# Patient Record
Sex: FEMALE | Race: WHITE | Employment: OTHER | URBAN - METROPOLITAN AREA
[De-identification: names, ages, dates, MRNs, and addresses within clinical notes are randomized per-mention and may not be internally consistent; named-entity substitution may affect disease eponyms.]

---

## 2017-10-27 LAB
ALBUMIN SERPL-MCNC: 3.8 G/DL
ALP BLD-CCNC: 93 U/L
ALT SERPL-CCNC: 17 U/L
ANION GAP SERPL CALCULATED.3IONS-SCNC: 14 MMOL/L
AST SERPL-CCNC: 16 U/L
AVERAGE GLUCOSE: NORMAL
BASOPHILS ABSOLUTE: 0.24 /ΜL
BASOPHILS RELATIVE PERCENT: 2 %
BILIRUB SERPL-MCNC: 0.4 MG/DL (ref 0.1–1.4)
BUN BLDV-MCNC: 28 MG/DL
CALCIUM SERPL-MCNC: 9.8 MG/DL
CHLORIDE BLD-SCNC: 107 MMOL/L
CHOLESTEROL, TOTAL: 208 MG/DL
CHOLESTEROL/HDL RATIO: 2.5
CO2: 26 MMOL/L
CREAT SERPL-MCNC: 1.33 MG/DL
EOSINOPHILS ABSOLUTE: 0.04 /ΜL
EOSINOPHILS RELATIVE PERCENT: 0.3 %
GFR CALCULATED: NORMAL
GLUCOSE BLD-MCNC: 92 MG/DL
HBA1C MFR BLD: 5.7 %
HCT VFR BLD CALC: 36.3 % (ref 36–46)
HDLC SERPL-MCNC: 82 MG/DL (ref 35–70)
HEMOGLOBIN: 11.4 G/DL (ref 12–16)
LDL CHOLESTEROL CALCULATED: 94 MG/DL (ref 0–160)
LYMPHOCYTES ABSOLUTE: 0.97 /ΜL
LYMPHOCYTES RELATIVE PERCENT: 8 %
MCH RBC QN AUTO: 28.9 PG
MCHC RBC AUTO-ENTMCNC: 31.4 G/DL
MCV RBC AUTO: 97.9 FL
MONOCYTES ABSOLUTE: 0.66 /ΜL
MONOCYTES RELATIVE PERCENT: 5.5 %
NEUTROPHILS ABSOLUTE: 10.14 /ΜL
NEUTROPHILS RELATIVE PERCENT: 84.2 %
PDW BLD-RTO: 16.3 %
PLATELET # BLD: 407 K/ΜL
PMV BLD AUTO: 10.1 FL
POTASSIUM SERPL-SCNC: 4.8 MMOL/L
RBC # BLD: 3.95 10^6/ΜL
SODIUM BLD-SCNC: 142 MMOL/L
T4 FREE: 1.11
TOTAL PROTEIN: 6.7
TRIGL SERPL-MCNC: 159 MG/DL
TSH SERPL DL<=0.05 MIU/L-ACNC: 1.53 UIU/ML
URIC ACID: 8.5
VLDLC SERPL CALC-MCNC: ABNORMAL MG/DL
WBC # BLD: 12 10^3/ML

## 2018-01-01 ENCOUNTER — APPOINTMENT (OUTPATIENT)
Dept: GENERAL RADIOLOGY | Age: 68
DRG: 062 | End: 2018-01-01
Payer: MEDICARE

## 2018-01-01 ENCOUNTER — OFFICE VISIT (OUTPATIENT)
Dept: CARDIOLOGY CLINIC | Age: 68
End: 2018-01-01
Payer: MEDICARE

## 2018-01-01 ENCOUNTER — APPOINTMENT (OUTPATIENT)
Dept: CT IMAGING | Age: 68
DRG: 062 | End: 2018-01-01
Payer: MEDICARE

## 2018-01-01 ENCOUNTER — HOSPITAL ENCOUNTER (INPATIENT)
Age: 68
LOS: 1 days | DRG: 062 | End: 2018-11-12
Attending: EMERGENCY MEDICINE | Admitting: INTERNAL MEDICINE
Payer: MEDICARE

## 2018-01-01 ENCOUNTER — TELEPHONE (OUTPATIENT)
Dept: PRIMARY CARE CLINIC | Age: 68
End: 2018-01-01

## 2018-01-01 ENCOUNTER — OFFICE VISIT (OUTPATIENT)
Dept: PRIMARY CARE CLINIC | Age: 68
End: 2018-01-01
Payer: MEDICARE

## 2018-01-01 ENCOUNTER — TELEPHONE (OUTPATIENT)
Dept: CARDIOLOGY CLINIC | Age: 68
End: 2018-01-01

## 2018-01-01 VITALS
HEIGHT: 66 IN | WEIGHT: 117 LBS | HEART RATE: 55 BPM | RESPIRATION RATE: 18 BRPM | OXYGEN SATURATION: 97 % | TEMPERATURE: 97.2 F | DIASTOLIC BLOOD PRESSURE: 70 MMHG | BODY MASS INDEX: 18.8 KG/M2 | SYSTOLIC BLOOD PRESSURE: 138 MMHG

## 2018-01-01 VITALS
TEMPERATURE: 98 F | WEIGHT: 126.1 LBS | RESPIRATION RATE: 25 BRPM | DIASTOLIC BLOOD PRESSURE: 40 MMHG | SYSTOLIC BLOOD PRESSURE: 80 MMHG | BODY MASS INDEX: 20.27 KG/M2 | HEIGHT: 66 IN | OXYGEN SATURATION: 56 %

## 2018-01-01 VITALS
DIASTOLIC BLOOD PRESSURE: 70 MMHG | SYSTOLIC BLOOD PRESSURE: 114 MMHG | WEIGHT: 117 LBS | RESPIRATION RATE: 16 BRPM | HEIGHT: 66 IN | HEART RATE: 90 BPM | BODY MASS INDEX: 18.8 KG/M2

## 2018-01-01 DIAGNOSIS — A41.9 SEPTIC SHOCK (HCC): ICD-10-CM

## 2018-01-01 DIAGNOSIS — R73.09 ELEVATED HEMOGLOBIN A1C: ICD-10-CM

## 2018-01-01 DIAGNOSIS — N18.30 CKD (CHRONIC KIDNEY DISEASE) STAGE 3, GFR 30-59 ML/MIN (HCC): ICD-10-CM

## 2018-01-01 DIAGNOSIS — I48.21 PERMANENT ATRIAL FIBRILLATION (HCC): ICD-10-CM

## 2018-01-01 DIAGNOSIS — D53.9 ANEMIA ASSOCIATED WITH NUTRITIONAL DEFICIENCY: Primary | ICD-10-CM

## 2018-01-01 DIAGNOSIS — I48.0 PAROXYSMAL ATRIAL FIBRILLATION (HCC): Primary | ICD-10-CM

## 2018-01-01 DIAGNOSIS — E87.6 HYPOKALEMIA: ICD-10-CM

## 2018-01-01 DIAGNOSIS — L89.891: ICD-10-CM

## 2018-01-01 DIAGNOSIS — I48.0 PAROXYSMAL ATRIAL FIBRILLATION (HCC): ICD-10-CM

## 2018-01-01 DIAGNOSIS — R53.83 FATIGUE, UNSPECIFIED TYPE: ICD-10-CM

## 2018-01-01 DIAGNOSIS — E78.00 PURE HYPERCHOLESTEROLEMIA: ICD-10-CM

## 2018-01-01 DIAGNOSIS — E03.9 ACQUIRED HYPOTHYROIDISM: ICD-10-CM

## 2018-01-01 DIAGNOSIS — M89.9 DISORDER OF BONE: ICD-10-CM

## 2018-01-01 DIAGNOSIS — I63.9 CEREBROVASCULAR ACCIDENT (CVA), UNSPECIFIED MECHANISM (HCC): Primary | ICD-10-CM

## 2018-01-01 DIAGNOSIS — D53.9 ANEMIA ASSOCIATED WITH NUTRITIONAL DEFICIENCY: ICD-10-CM

## 2018-01-01 DIAGNOSIS — R65.21 SEPTIC SHOCK (HCC): ICD-10-CM

## 2018-01-01 DIAGNOSIS — R53.1 WEAKNESS: ICD-10-CM

## 2018-01-01 DIAGNOSIS — R78.81 BACTEREMIA: ICD-10-CM

## 2018-01-01 DIAGNOSIS — I01.1 RHEUMATOID AORTITIS: ICD-10-CM

## 2018-01-01 DIAGNOSIS — K21.00 REFLUX ESOPHAGITIS: ICD-10-CM

## 2018-01-01 LAB
ALBUMIN SERPL-MCNC: 3.7 G/DL
ALBUMIN SERPL-MCNC: 3.9 G/DL
ALBUMIN SERPL-MCNC: 4.1 G/DL (ref 3.5–5.2)
ALP BLD-CCNC: 1.36 U/L
ALP BLD-CCNC: 80 U/L (ref 35–104)
ALP BLD-CCNC: 82 U/L
ALT SERPL-CCNC: 10 U/L (ref 0–32)
ALT SERPL-CCNC: 16 U/L
ALT SERPL-CCNC: 19 U/L
ANION GAP SERPL CALCULATED.3IONS-SCNC: 12 MMOL/L
ANION GAP SERPL CALCULATED.3IONS-SCNC: 15 MMOL/L
ANION GAP SERPL CALCULATED.3IONS-SCNC: 18 MMOL/L (ref 7–16)
ANION GAP SERPL CALCULATED.3IONS-SCNC: 25 MMOL/L (ref 7–16)
ANISOCYTOSIS: ABNORMAL
APTT: 24.2 SEC (ref 24.5–35.1)
AST SERPL-CCNC: 14 U/L
AST SERPL-CCNC: 14 U/L (ref 0–31)
AST SERPL-CCNC: 18 U/L
AVERAGE GLUCOSE: 120
AVERAGE GLUCOSE: NORMAL
B.E.: -8.5 MMOL/L (ref -3–3)
BASOPHILS ABSOLUTE: 0 E9/L (ref 0–0.2)
BASOPHILS ABSOLUTE: 0.03 /ΜL
BASOPHILS ABSOLUTE: 0.03 /ΜL
BASOPHILS ABSOLUTE: 0.05 E9/L (ref 0–0.2)
BASOPHILS RELATIVE PERCENT: 0.2 %
BASOPHILS RELATIVE PERCENT: 0.2 %
BASOPHILS RELATIVE PERCENT: 0.3 % (ref 0–2)
BASOPHILS RELATIVE PERCENT: 0.3 % (ref 0–2)
BILIRUB SERPL-MCNC: 0.4 MG/DL (ref 0.1–1.4)
BILIRUB SERPL-MCNC: 0.4 MG/DL (ref 0.1–1.4)
BILIRUB SERPL-MCNC: 0.4 MG/DL (ref 0–1.2)
BUN BLDV-MCNC: 29 MG/DL
BUN BLDV-MCNC: 31 MG/DL
BUN BLDV-MCNC: 31 MG/DL (ref 8–23)
BUN BLDV-MCNC: 41 MG/DL (ref 8–23)
BURR CELLS: ABNORMAL
CALCIUM SERPL-MCNC: 9.1 MG/DL (ref 8.6–10.2)
CALCIUM SERPL-MCNC: 9.4 MG/DL
CALCIUM SERPL-MCNC: 9.5 MG/DL (ref 8.6–10.2)
CALCIUM SERPL-MCNC: 9.8 MG/DL
CHLORIDE BLD-SCNC: 101 MMOL/L (ref 98–107)
CHLORIDE BLD-SCNC: 103 MMOL/L (ref 98–107)
CHLORIDE BLD-SCNC: 107 MMOL/L
CHLORIDE BLD-SCNC: 109 MMOL/L
CHOLESTEROL, TOTAL: 162 MG/DL (ref 0–199)
CHOLESTEROL, TOTAL: 212 MG/DL
CHOLESTEROL, TOTAL: 222 MG/DL
CHOLESTEROL/HDL RATIO: 2.6
CHOLESTEROL/HDL RATIO: 2.7
CO2: 14 MMOL/L (ref 22–29)
CO2: 22 MMOL/L (ref 22–29)
CO2: 25 MMOL/L
CO2: 26 MMOL/L
COHB: 0.3 % (ref 0–1.5)
CREAT SERPL-MCNC: 1.25 MG/DL
CREAT SERPL-MCNC: 1.29 MG/DL
CREAT SERPL-MCNC: 1.3 MG/DL (ref 0.5–1)
CREAT SERPL-MCNC: 2.1 MG/DL (ref 0.5–1)
CRITICAL: ABNORMAL
DATE ANALYZED: ABNORMAL
DATE OF COLLECTION: ABNORMAL
DIGOXIN LEVEL: 1.55
EKG ATRIAL RATE: 58 BPM
EKG P AXIS: 71 DEGREES
EKG P-R INTERVAL: 140 MS
EKG Q-T INTERVAL: 424 MS
EKG QRS DURATION: 76 MS
EKG QTC CALCULATION (BAZETT): 416 MS
EKG R AXIS: 62 DEGREES
EKG T AXIS: 18 DEGREES
EKG VENTRICULAR RATE: 58 BPM
EOSINOPHILS ABSOLUTE: 0 E9/L (ref 0.05–0.5)
EOSINOPHILS ABSOLUTE: 0.01 /ΜL
EOSINOPHILS ABSOLUTE: 0.01 E9/L (ref 0.05–0.5)
EOSINOPHILS ABSOLUTE: ABNORMAL /ΜL
EOSINOPHILS RELATIVE PERCENT: 0 % (ref 0–6)
EOSINOPHILS RELATIVE PERCENT: 0.1 %
EOSINOPHILS RELATIVE PERCENT: 0.1 % (ref 0–6)
EOSINOPHILS RELATIVE PERCENT: ABNORMAL %
GFR AFRICAN AMERICAN: 28
GFR AFRICAN AMERICAN: 49
GFR CALCULATED: NORMAL
GFR CALCULATED: NORMAL
GFR NON-AFRICAN AMERICAN: 23 ML/MIN/1.73
GFR NON-AFRICAN AMERICAN: 40 ML/MIN/1.73
GLUCOSE BLD-MCNC: 149 MG/DL (ref 74–99)
GLUCOSE BLD-MCNC: 66 MG/DL (ref 74–99)
GLUCOSE BLD-MCNC: 92 MG/DL
GLUCOSE BLD-MCNC: 94 MG/DL
HBA1C MFR BLD: 5.1 % (ref 4–5.6)
HBA1C MFR BLD: 5.7 %
HBA1C MFR BLD: 5.8 %
HCO3: 14.9 MMOL/L (ref 22–26)
HCT VFR BLD CALC: 27.7 % (ref 34–48)
HCT VFR BLD CALC: 36 % (ref 34–48)
HCT VFR BLD CALC: 36.7 % (ref 36–46)
HCT VFR BLD CALC: 37.5 % (ref 36–46)
HDLC SERPL-MCNC: 59 MG/DL
HDLC SERPL-MCNC: 79 MG/DL (ref 35–70)
HDLC SERPL-MCNC: 86 MG/DL (ref 35–70)
HEMOGLOBIN: 10.9 G/DL (ref 11.5–15.5)
HEMOGLOBIN: 11.3 G/DL (ref 12–16)
HEMOGLOBIN: 11.3 G/DL (ref 12–16)
HEMOGLOBIN: 8.4 G/DL (ref 11.5–15.5)
HHB: 0.3 % (ref 0–5)
HYPOCHROMIA: ABNORMAL
IMMATURE GRANULOCYTES #: 0.37 E9/L
IMMATURE GRANULOCYTES %: 2.2 % (ref 0–5)
INR BLD: 1
LAB: 9558
LDL CHOLESTEROL CALCULATED: 106 MG/DL (ref 0–160)
LDL CHOLESTEROL CALCULATED: 111 MG/DL (ref 0–160)
LDL CHOLESTEROL CALCULATED: 70 MG/DL (ref 0–99)
LYMPHOCYTES ABSOLUTE: 0.93 /ΜL
LYMPHOCYTES ABSOLUTE: 0.98 E9/L (ref 1.5–4)
LYMPHOCYTES ABSOLUTE: 1.16 /ΜL
LYMPHOCYTES ABSOLUTE: 1.82 E9/L (ref 1.5–4)
LYMPHOCYTES RELATIVE PERCENT: 11 % (ref 20–42)
LYMPHOCYTES RELATIVE PERCENT: 2.6 % (ref 20–42)
LYMPHOCYTES RELATIVE PERCENT: 7.2 %
LYMPHOCYTES RELATIVE PERCENT: 8.2 %
Lab: ABNORMAL
MCH RBC QN AUTO: 28 PG
MCH RBC QN AUTO: 28.5 PG (ref 26–35)
MCH RBC QN AUTO: 28.8 PG
MCH RBC QN AUTO: 29.1 PG (ref 26–35)
MCHC RBC AUTO-ENTMCNC: 30.1 G/DL
MCHC RBC AUTO-ENTMCNC: 30.3 % (ref 32–34.5)
MCHC RBC AUTO-ENTMCNC: 30.3 % (ref 32–34.5)
MCHC RBC AUTO-ENTMCNC: 30.8 G/DL
MCV RBC AUTO: 90.8 FL
MCV RBC AUTO: 94.2 FL (ref 80–99.9)
MCV RBC AUTO: 95.4 FL
MCV RBC AUTO: 95.8 FL (ref 80–99.9)
METAMYELOCYTES RELATIVE PERCENT: 1.8 % (ref 0–1)
METER GLUCOSE: 104 MG/DL (ref 74–99)
METER GLUCOSE: 123 MG/DL (ref 74–99)
METER GLUCOSE: <40 MG/DL (ref 74–99)
METER GLUCOSE: <40 MG/DL (ref 74–99)
METHB: 0.6 % (ref 0–1.5)
MODE: ABNORMAL
MONOCYTES ABSOLUTE: 0.8 E9/L (ref 0.1–0.95)
MONOCYTES ABSOLUTE: 0.83 /ΜL
MONOCYTES ABSOLUTE: 0.88 /ΜL
MONOCYTES ABSOLUTE: 2.62 E9/L (ref 0.1–0.95)
MONOCYTES RELATIVE PERCENT: 4.8 % (ref 2–12)
MONOCYTES RELATIVE PERCENT: 6.2 %
MONOCYTES RELATIVE PERCENT: 6.4 %
MONOCYTES RELATIVE PERCENT: 7.9 % (ref 2–12)
NEUTROPHILS ABSOLUTE: 10.9 /ΜL
NEUTROPHILS ABSOLUTE: 11.73 /ΜL
NEUTROPHILS ABSOLUTE: 13.54 E9/L (ref 1.8–7.3)
NEUTROPHILS ABSOLUTE: 29.43 E9/L (ref 1.8–7.3)
NEUTROPHILS RELATIVE PERCENT: 81.6 % (ref 43–80)
NEUTROPHILS RELATIVE PERCENT: 83.3 %
NEUTROPHILS RELATIVE PERCENT: 84 %
NEUTROPHILS RELATIVE PERCENT: 87.7 % (ref 43–80)
O2 SATURATION: 99.7 % (ref 92–98.5)
O2HB: 98.8 % (ref 94–97)
OPERATOR ID: ABNORMAL
OVALOCYTES: ABNORMAL
PATIENT TEMP: 37 C
PCO2: 22.1 MMHG (ref 35–45)
PDW BLD-RTO: 14.9 %
PDW BLD-RTO: 15 FL (ref 11.5–15)
PDW BLD-RTO: 15.1 FL (ref 11.5–15)
PDW BLD-RTO: 16.3 %
PH BLOOD GAS: 7.45 (ref 7.35–7.45)
PLATELET # BLD: 405 E9/L (ref 130–450)
PLATELET # BLD: 417 E9/L (ref 130–450)
PLATELET # BLD: 449 K/ΜL
PLATELET # BLD: 454 K/ΜL
PMV BLD AUTO: 10 FL
PMV BLD AUTO: 10.3 FL
PMV BLD AUTO: 10.9 FL (ref 7–12)
PMV BLD AUTO: 9.7 FL (ref 7–12)
PO2: >500 MMHG (ref 60–100)
POIKILOCYTES: ABNORMAL
POLYCHROMASIA: ABNORMAL
POTASSIUM REFLEX MAGNESIUM: 5.3 MMOL/L (ref 3.5–5)
POTASSIUM SERPL-SCNC: 4.8 MMOL/L
POTASSIUM SERPL-SCNC: 4.9 MMOL/L
POTASSIUM SERPL-SCNC: 4.9 MMOL/L (ref 3.5–5)
POTASSIUM SERPL-SCNC: 6.29 MMOL/L (ref 3.3–5.1)
PROTHROMBIN TIME: 11.7 SEC (ref 9.3–12.4)
RBC # BLD: 2.89 E12/L (ref 3.5–5.5)
RBC # BLD: 3.82 E12/L (ref 3.5–5.5)
RBC # BLD: 3.93 10^6/ΜL
RBC # BLD: 4.04 10^6/ΜL
SCHISTOCYTES: ABNORMAL
SEDIMENTATION RATE, ERYTHROCYTE: 38
SODIUM BLD-SCNC: 140 MMOL/L (ref 132–146)
SODIUM BLD-SCNC: 142 MMOL/L
SODIUM BLD-SCNC: 142 MMOL/L
SODIUM BLD-SCNC: 143 MMOL/L (ref 132–146)
SOURCE, BLOOD GAS: ABNORMAL
TEAR DROP CELLS: ABNORMAL
THB: 5.3 G/DL (ref 11.5–16.5)
TIME ANALYZED: 1724
TOTAL PROTEIN: 6.8
TOTAL PROTEIN: 7
TOTAL PROTEIN: 7.1 G/DL (ref 6.4–8.3)
TRIGL SERPL-MCNC: 110 MG/DL
TRIGL SERPL-MCNC: 150 MG/DL
TRIGL SERPL-MCNC: 166 MG/DL (ref 0–149)
TROPONIN: 0.02 NG/ML (ref 0–0.03)
TSH SERPL DL<=0.05 MIU/L-ACNC: 1.17 UIU/ML
TSH SERPL DL<=0.05 MIU/L-ACNC: 1.36 UIU/ML
URIC ACID: 6.8
URIC ACID: 6.9
VITAMIN D 25-HYDROXY: 63.5
VITAMIN D 25-HYDROXY: 64.2
VITAMIN D2, 25 HYDROXY: NORMAL
VITAMIN D2, 25 HYDROXY: NORMAL
VITAMIN D3,25 HYDROXY: NORMAL
VITAMIN D3,25 HYDROXY: NORMAL
VLDLC SERPL CALC-MCNC: 33 MG/DL
VLDLC SERPL CALC-MCNC: ABNORMAL MG/DL
VLDLC SERPL CALC-MCNC: ABNORMAL MG/DL
WBC # BLD: 13 10^3/ML
WBC # BLD: 14.1 10^3/ML
WBC # BLD: 16.6 E9/L (ref 4.5–11.5)
WBC # BLD: 32.7 E9/L (ref 4.5–11.5)

## 2018-01-01 PROCEDURE — 97162 PT EVAL MOD COMPLEX 30 MIN: CPT

## 2018-01-01 PROCEDURE — 82962 GLUCOSE BLOOD TEST: CPT

## 2018-01-01 PROCEDURE — 84484 ASSAY OF TROPONIN QUANT: CPT

## 2018-01-01 PROCEDURE — 97530 THERAPEUTIC ACTIVITIES: CPT

## 2018-01-01 PROCEDURE — 2580000003 HC RX 258: Performed by: INTERNAL MEDICINE

## 2018-01-01 PROCEDURE — 99291 CRITICAL CARE FIRST HOUR: CPT | Performed by: SURGERY

## 2018-01-01 PROCEDURE — 85730 THROMBOPLASTIN TIME PARTIAL: CPT

## 2018-01-01 PROCEDURE — 80048 BASIC METABOLIC PNL TOTAL CA: CPT

## 2018-01-01 PROCEDURE — 2580000003 HC RX 258: Performed by: NURSE PRACTITIONER

## 2018-01-01 PROCEDURE — 36556 INSERT NON-TUNNEL CV CATH: CPT | Performed by: SURGERY

## 2018-01-01 PROCEDURE — 6360000002 HC RX W HCPCS: Performed by: INTERNAL MEDICINE

## 2018-01-01 PROCEDURE — 2500000003 HC RX 250 WO HCPCS: Performed by: INTERNAL MEDICINE

## 2018-01-01 PROCEDURE — 36415 COLL VENOUS BLD VENIPUNCTURE: CPT

## 2018-01-01 PROCEDURE — 6360000004 HC RX CONTRAST MEDICATION: Performed by: RADIOLOGY

## 2018-01-01 PROCEDURE — 93000 ELECTROCARDIOGRAM COMPLETE: CPT | Performed by: INTERNAL MEDICINE

## 2018-01-01 PROCEDURE — 71045 X-RAY EXAM CHEST 1 VIEW: CPT

## 2018-01-01 PROCEDURE — 70450 CT HEAD/BRAIN W/O DYE: CPT

## 2018-01-01 PROCEDURE — 70496 CT ANGIOGRAPHY HEAD: CPT

## 2018-01-01 PROCEDURE — 84132 ASSAY OF SERUM POTASSIUM: CPT

## 2018-01-01 PROCEDURE — 6360000002 HC RX W HCPCS: Performed by: NURSE PRACTITIONER

## 2018-01-01 PROCEDURE — 85025 COMPLETE CBC W/AUTO DIFF WBC: CPT

## 2018-01-01 PROCEDURE — 85610 PROTHROMBIN TIME: CPT

## 2018-01-01 PROCEDURE — 31500 INSERT EMERGENCY AIRWAY: CPT

## 2018-01-01 PROCEDURE — 97110 THERAPEUTIC EXERCISES: CPT

## 2018-01-01 PROCEDURE — 94770 HC ETCO2 MONITOR DAILY: CPT

## 2018-01-01 PROCEDURE — 82805 BLOOD GASES W/O2 SATURATION: CPT

## 2018-01-01 PROCEDURE — 72125 CT NECK SPINE W/O DYE: CPT

## 2018-01-01 PROCEDURE — C9113 INJ PANTOPRAZOLE SODIUM, VIA: HCPCS | Performed by: NURSE PRACTITIONER

## 2018-01-01 PROCEDURE — 80053 COMPREHEN METABOLIC PANEL: CPT

## 2018-01-01 PROCEDURE — G8981 BODY POS CURRENT STATUS: HCPCS

## 2018-01-01 PROCEDURE — 2000000000 HC ICU R&B

## 2018-01-01 PROCEDURE — G8987 SELF CARE CURRENT STATUS: HCPCS

## 2018-01-01 PROCEDURE — G8982 BODY POS GOAL STATUS: HCPCS

## 2018-01-01 PROCEDURE — G8988 SELF CARE GOAL STATUS: HCPCS

## 2018-01-01 PROCEDURE — 97166 OT EVAL MOD COMPLEX 45 MIN: CPT

## 2018-01-01 PROCEDURE — 93005 ELECTROCARDIOGRAM TRACING: CPT | Performed by: EMERGENCY MEDICINE

## 2018-01-01 PROCEDURE — 99291 CRITICAL CARE FIRST HOUR: CPT | Performed by: PSYCHIATRY & NEUROLOGY

## 2018-01-01 PROCEDURE — 70486 CT MAXILLOFACIAL W/O DYE: CPT

## 2018-01-01 PROCEDURE — 3E03317 INTRODUCTION OF OTHER THROMBOLYTIC INTO PERIPHERAL VEIN, PERCUTANEOUS APPROACH: ICD-10-PCS | Performed by: EMERGENCY MEDICINE

## 2018-01-01 PROCEDURE — 83036 HEMOGLOBIN GLYCOSYLATED A1C: CPT

## 2018-01-01 PROCEDURE — 80061 LIPID PANEL: CPT

## 2018-01-01 PROCEDURE — 02HV33Z INSERTION OF INFUSION DEVICE INTO SUPERIOR VENA CAVA, PERCUTANEOUS APPROACH: ICD-10-PCS | Performed by: STUDENT IN AN ORGANIZED HEALTH CARE EDUCATION/TRAINING PROGRAM

## 2018-01-01 PROCEDURE — 73560 X-RAY EXAM OF KNEE 1 OR 2: CPT

## 2018-01-01 PROCEDURE — 99213 OFFICE O/P EST LOW 20 MIN: CPT | Performed by: INTERNAL MEDICINE

## 2018-01-01 PROCEDURE — 0042T CT BRAIN PERFUSION: CPT

## 2018-01-01 PROCEDURE — 87081 CULTURE SCREEN ONLY: CPT

## 2018-01-01 PROCEDURE — 99291 CRITICAL CARE FIRST HOUR: CPT

## 2018-01-01 PROCEDURE — 99221 1ST HOSP IP/OBS SF/LOW 40: CPT | Performed by: ORTHOPAEDIC SURGERY

## 2018-01-01 PROCEDURE — 6360000002 HC RX W HCPCS: Performed by: EMERGENCY MEDICINE

## 2018-01-01 PROCEDURE — 99223 1ST HOSP IP/OBS HIGH 75: CPT | Performed by: CLINICAL NURSE SPECIALIST

## 2018-01-01 PROCEDURE — 99222 1ST HOSP IP/OBS MODERATE 55: CPT | Performed by: NEUROLOGICAL SURGERY

## 2018-01-01 PROCEDURE — 70498 CT ANGIOGRAPHY NECK: CPT

## 2018-01-01 PROCEDURE — 99214 OFFICE O/P EST MOD 30 MIN: CPT | Performed by: INTERNAL MEDICINE

## 2018-01-01 RX ORDER — 0.9 % SODIUM CHLORIDE 0.9 %
VIAL (ML) INJECTION
Status: COMPLETED | OUTPATIENT
Start: 2018-01-01 | End: 2018-01-01

## 2018-01-01 RX ORDER — DEXTROSE MONOHYDRATE 50 MG/ML
100 INJECTION, SOLUTION INTRAVENOUS PRN
Status: DISCONTINUED | OUTPATIENT
Start: 2018-01-01 | End: 2018-11-13 | Stop reason: HOSPADM

## 2018-01-01 RX ORDER — SODIUM CHLORIDE 9 MG/ML
INJECTION, SOLUTION INTRAVENOUS CONTINUOUS
Status: DISCONTINUED | OUTPATIENT
Start: 2018-01-01 | End: 2018-11-13 | Stop reason: HOSPADM

## 2018-01-01 RX ORDER — PANTOPRAZOLE SODIUM 40 MG/10ML
40 INJECTION, POWDER, LYOPHILIZED, FOR SOLUTION INTRAVENOUS DAILY
Status: DISCONTINUED | OUTPATIENT
Start: 2018-01-01 | End: 2018-11-13 | Stop reason: HOSPADM

## 2018-01-01 RX ORDER — HYDROXYCHLOROQUINE SULFATE 200 MG/1
200 TABLET, FILM COATED ORAL DAILY
Status: DISCONTINUED | OUTPATIENT
Start: 2018-01-01 | End: 2018-01-01

## 2018-01-01 RX ORDER — DIGOXIN 125 MCG
125 TABLET ORAL DAILY
Status: DISCONTINUED | OUTPATIENT
Start: 2018-01-01 | End: 2018-01-01

## 2018-01-01 RX ORDER — SODIUM CHLORIDE 0.9 % (FLUSH) 0.9 %
10 SYRINGE (ML) INJECTION
Status: DISCONTINUED | OUTPATIENT
Start: 2018-01-01 | End: 2018-01-01 | Stop reason: SDUPTHER

## 2018-01-01 RX ORDER — DIGOXIN 0.25 MG/ML
125 INJECTION INTRAMUSCULAR; INTRAVENOUS DAILY
Status: DISCONTINUED | OUTPATIENT
Start: 2018-01-01 | End: 2018-01-01

## 2018-01-01 RX ORDER — LEVOTHYROXINE SODIUM 0.05 MG/1
50 TABLET ORAL DAILY
Status: DISCONTINUED | OUTPATIENT
Start: 2018-01-01 | End: 2018-11-13 | Stop reason: HOSPADM

## 2018-01-01 RX ORDER — NICOTINE POLACRILEX 4 MG
15 LOZENGE BUCCAL PRN
Status: DISCONTINUED | OUTPATIENT
Start: 2018-01-01 | End: 2018-11-13 | Stop reason: HOSPADM

## 2018-01-01 RX ORDER — METOPROLOL SUCCINATE 100 MG/1
100 TABLET, EXTENDED RELEASE ORAL DAILY
Status: DISCONTINUED | OUTPATIENT
Start: 2018-01-01 | End: 2018-01-01

## 2018-01-01 RX ORDER — 0.9 % SODIUM CHLORIDE 0.9 %
500 INTRAVENOUS SOLUTION INTRAVENOUS ONCE
Status: COMPLETED | OUTPATIENT
Start: 2018-01-01 | End: 2018-01-01

## 2018-01-01 RX ORDER — ATORVASTATIN CALCIUM 40 MG/1
40 TABLET, FILM COATED ORAL NIGHTLY
Status: DISCONTINUED | OUTPATIENT
Start: 2018-01-01 | End: 2018-01-01

## 2018-01-01 RX ORDER — 0.9 % SODIUM CHLORIDE 0.9 %
10 VIAL (ML) INJECTION DAILY
Status: DISCONTINUED | OUTPATIENT
Start: 2018-01-01 | End: 2018-11-13 | Stop reason: HOSPADM

## 2018-01-01 RX ORDER — DEXTROSE MONOHYDRATE 25 G/50ML
12.5 INJECTION, SOLUTION INTRAVENOUS
Status: ACTIVE | OUTPATIENT
Start: 2018-01-01 | End: 2018-01-01

## 2018-01-01 RX ORDER — SODIUM CHLORIDE 0.9 % (FLUSH) 0.9 %
10 SYRINGE (ML) INJECTION PRN
Status: DISCONTINUED | OUTPATIENT
Start: 2018-01-01 | End: 2018-11-13 | Stop reason: HOSPADM

## 2018-01-01 RX ORDER — PANTOPRAZOLE SODIUM 40 MG/1
40 TABLET, DELAYED RELEASE ORAL
Status: DISCONTINUED | OUTPATIENT
Start: 2018-01-01 | End: 2018-01-01

## 2018-01-01 RX ORDER — PRAVASTATIN SODIUM 40 MG
40 TABLET ORAL DAILY
Qty: 90 TABLET | Refills: 2 | Status: SHIPPED | OUTPATIENT
Start: 2018-01-01

## 2018-01-01 RX ORDER — ONDANSETRON 2 MG/ML
4 INJECTION INTRAMUSCULAR; INTRAVENOUS EVERY 6 HOURS PRN
Status: DISCONTINUED | OUTPATIENT
Start: 2018-01-01 | End: 2018-11-13 | Stop reason: HOSPADM

## 2018-01-01 RX ORDER — PRAVASTATIN SODIUM 20 MG
40 TABLET ORAL DAILY
Status: DISCONTINUED | OUTPATIENT
Start: 2018-01-01 | End: 2018-01-01

## 2018-01-01 RX ORDER — SODIUM CHLORIDE 0.9 % (FLUSH) 0.9 %
10 SYRINGE (ML) INJECTION EVERY 12 HOURS SCHEDULED
Status: DISCONTINUED | OUTPATIENT
Start: 2018-01-01 | End: 2018-01-01 | Stop reason: SDUPTHER

## 2018-01-01 RX ORDER — SODIUM CHLORIDE 0.9 % (FLUSH) 0.9 %
10 SYRINGE (ML) INJECTION PRN
Status: DISCONTINUED | OUTPATIENT
Start: 2018-01-01 | End: 2018-01-01 | Stop reason: SDUPTHER

## 2018-01-01 RX ORDER — PREDNISONE 1 MG/1
5 TABLET ORAL DAILY
Status: DISCONTINUED | OUTPATIENT
Start: 2018-01-01 | End: 2018-01-01

## 2018-01-01 RX ORDER — DEXTROSE MONOHYDRATE 25 G/50ML
12.5 INJECTION, SOLUTION INTRAVENOUS PRN
Status: DISCONTINUED | OUTPATIENT
Start: 2018-01-01 | End: 2018-11-13 | Stop reason: HOSPADM

## 2018-01-01 RX ORDER — PANTOPRAZOLE SODIUM 40 MG/1
40 TABLET, DELAYED RELEASE ORAL
Qty: 90 TABLET | Refills: 2 | Status: SHIPPED | OUTPATIENT
Start: 2018-01-01

## 2018-01-01 RX ORDER — 0.9 % SODIUM CHLORIDE 0.9 %
1000 INTRAVENOUS SOLUTION INTRAVENOUS ONCE
Status: COMPLETED | OUTPATIENT
Start: 2018-01-01 | End: 2018-01-01

## 2018-01-01 RX ORDER — HYDRALAZINE HYDROCHLORIDE 20 MG/ML
10 INJECTION INTRAMUSCULAR; INTRAVENOUS EVERY 6 HOURS PRN
Status: DISCONTINUED | OUTPATIENT
Start: 2018-01-01 | End: 2018-01-01

## 2018-01-01 RX ORDER — LEVOTHYROXINE SODIUM 0.05 MG/1
50 TABLET ORAL DAILY
Qty: 90 TABLET | Refills: 2 | Status: SHIPPED | OUTPATIENT
Start: 2018-01-01

## 2018-01-01 RX ORDER — DILTIAZEM HYDROCHLORIDE 300 MG/1
300 CAPSULE, COATED, EXTENDED RELEASE ORAL DAILY
Qty: 90 CAPSULE | Refills: 3 | Status: SHIPPED | OUTPATIENT
Start: 2018-01-01

## 2018-01-01 RX ORDER — METOPROLOL TARTRATE 5 MG/5ML
5 INJECTION INTRAVENOUS EVERY 6 HOURS
Status: DISCONTINUED | OUTPATIENT
Start: 2018-01-01 | End: 2018-01-01

## 2018-01-01 RX ORDER — SODIUM CHLORIDE 0.9 % (FLUSH) 0.9 %
10 SYRINGE (ML) INJECTION EVERY 12 HOURS SCHEDULED
Status: DISCONTINUED | OUTPATIENT
Start: 2018-01-01 | End: 2018-11-13 | Stop reason: HOSPADM

## 2018-01-01 RX ADMIN — SODIUM BICARBONATE 50 MEQ: 84 INJECTION, SOLUTION INTRAVENOUS at 17:13

## 2018-01-01 RX ADMIN — SODIUM CHLORIDE 10 ML: 9 INJECTION INTRAMUSCULAR; INTRAVENOUS; SUBCUTANEOUS at 17:18

## 2018-01-01 RX ADMIN — SODIUM CHLORIDE: 9 INJECTION, SOLUTION INTRAVENOUS at 12:23

## 2018-01-01 RX ADMIN — SODIUM BICARBONATE 50 MEQ: 84 INJECTION, SOLUTION INTRAVENOUS at 17:15

## 2018-01-01 RX ADMIN — ALTEPLASE 44.1 MG: KIT at 15:57

## 2018-01-01 RX ADMIN — SODIUM CHLORIDE 500 ML: 9 INJECTION, SOLUTION INTRAVENOUS at 14:33

## 2018-01-01 RX ADMIN — IOPAMIDOL 100 ML: 755 INJECTION, SOLUTION INTRAVENOUS at 16:40

## 2018-01-01 RX ADMIN — EPINEPHRINE 1 MG: 0.1 INJECTION, SOLUTION ENDOTRACHEAL; INTRACARDIAC; INTRAVENOUS at 17:10

## 2018-01-01 RX ADMIN — ONDANSETRON 4 MG: 2 INJECTION INTRAMUSCULAR; INTRAVENOUS at 02:34

## 2018-01-01 RX ADMIN — EPINEPHRINE 1 MG: 0.1 INJECTION, SOLUTION ENDOTRACHEAL; INTRACARDIAC; INTRAVENOUS at 17:14

## 2018-01-01 RX ADMIN — SODIUM CHLORIDE 10 ML: 9 INJECTION INTRAMUSCULAR; INTRAVENOUS; SUBCUTANEOUS at 17:10

## 2018-01-01 RX ADMIN — SODIUM CHLORIDE 10 ML: 9 INJECTION INTRAMUSCULAR; INTRAVENOUS; SUBCUTANEOUS at 17:13

## 2018-01-01 RX ADMIN — SODIUM CHLORIDE 1000 ML: 9 INJECTION, SOLUTION INTRAVENOUS at 09:23

## 2018-01-01 RX ADMIN — SODIUM CHLORIDE 10 ML: 9 INJECTION INTRAMUSCULAR; INTRAVENOUS; SUBCUTANEOUS at 17:14

## 2018-01-01 RX ADMIN — Medication 10 ML: at 21:00

## 2018-01-01 RX ADMIN — Medication 10 ML: at 12:20

## 2018-01-01 RX ADMIN — ONDANSETRON 4 MG: 2 INJECTION INTRAMUSCULAR; INTRAVENOUS at 20:24

## 2018-01-01 RX ADMIN — PANTOPRAZOLE SODIUM 40 MG: 40 INJECTION, POWDER, FOR SOLUTION INTRAVENOUS at 12:20

## 2018-01-01 RX ADMIN — SODIUM CHLORIDE 10 ML: 9 INJECTION INTRAMUSCULAR; INTRAVENOUS; SUBCUTANEOUS at 17:15

## 2018-01-01 RX ADMIN — EPINEPHRINE 1 MG: 0.1 INJECTION, SOLUTION ENDOTRACHEAL; INTRACARDIAC; INTRAVENOUS at 17:18

## 2018-01-01 RX ADMIN — DEXTROSE MONOHYDRATE 12.5 G: 25 INJECTION, SOLUTION INTRAVENOUS at 12:53

## 2018-01-01 RX ADMIN — EPINEPHRINE 1 MG: 0.1 INJECTION, SOLUTION ENDOTRACHEAL; INTRACARDIAC; INTRAVENOUS at 17:15

## 2018-01-01 ASSESSMENT — ENCOUNTER SYMPTOMS
EYE REDNESS: 0
SHORTNESS OF BREATH: 0
HEMOPTYSIS: 0
DIARRHEA: 0
DOUBLE VISION: 0
NAUSEA: 0
EYE PAIN: 0
BLURRED VISION: 0
BLOOD IN STOOL: 0
STRIDOR: 0

## 2018-01-01 ASSESSMENT — PATIENT HEALTH QUESTIONNAIRE - PHQ9
SUM OF ALL RESPONSES TO PHQ9 QUESTIONS 1 & 2: 0
2. FEELING DOWN, DEPRESSED OR HOPELESS: 0
SUM OF ALL RESPONSES TO PHQ QUESTIONS 1-9: 0
1. LITTLE INTEREST OR PLEASURE IN DOING THINGS: 0

## 2018-01-01 ASSESSMENT — PAIN SCALES - GENERAL
PAINLEVEL_OUTOF10: 0

## 2018-05-16 PROBLEM — E03.9 ACQUIRED HYPOTHYROIDISM: Status: ACTIVE | Noted: 2018-01-01

## 2018-05-16 PROBLEM — R73.09 ELEVATED HEMOGLOBIN A1C: Status: ACTIVE | Noted: 2018-01-01

## 2018-05-16 PROBLEM — K21.00 REFLUX ESOPHAGITIS: Status: ACTIVE | Noted: 2018-01-01

## 2018-05-16 PROBLEM — N18.30 CKD (CHRONIC KIDNEY DISEASE) STAGE 3, GFR 30-59 ML/MIN (HCC): Status: ACTIVE | Noted: 2018-01-01

## 2018-05-16 PROBLEM — M89.9 DISORDER OF BONE: Status: ACTIVE | Noted: 2018-01-01

## 2018-05-16 PROBLEM — E78.00 PURE HYPERCHOLESTEROLEMIA: Status: ACTIVE | Noted: 2018-01-01

## 2018-08-15 NOTE — TELEPHONE ENCOUNTER
I believe we gave some orders as to what to do with her thyroid after the test on 7/18/18 was reviewed? Please advise what we told her then?

## 2018-11-11 PROBLEM — I63.9 ACUTE ISCHEMIC STROKE (HCC): Status: ACTIVE | Noted: 2018-01-01

## 2018-11-11 PROBLEM — I63.9 ACUTE CEREBROVASCULAR ACCIDENT (CVA) (HCC): Status: ACTIVE | Noted: 2018-01-01

## 2018-11-11 NOTE — PROGRESS NOTES
Discussed case with Dr. Naty Negron, reviewed CT/CTA/CTP. Patient with old infarct, core infarct and ischemia. Discussed case with Dr. Kassandra Gregory who is in the hospital and onsite. We agree borderline but possible brain at risk, will offer to family intervention.  Regardless patient likely to have core infarct and permanent speech impairment    Discussed with Dr. Kassandra Gregory, he is onsite, he will see family and offer to family that he will perform procedure

## 2018-11-11 NOTE — CONSULTS
stroke        Recommendations:  IV t-PA was Administered-Total dose IV tPA is 0.9 mg/kg (maximum 90 mg). Given 10% over one minute, then remaining 90% given as infusion over 60 minutes. This patient IS a potential candidate for endovascular treatment: To be discussed by ED team with the endovascular team.     Consultation completed by Adrien Kellogg via telephone.       Electronically signed by Adrien Kellogg on 11/11/2018 at 5:21 PM

## 2018-11-11 NOTE — CONSULTS
 UTI (urinary tract infection)        Past Surgical History:   Procedure Laterality Date    CATARACT REMOVAL  12/2017    ECHO COMPL W DOP COLOR FLOW  5/22/2013         THORACENTESIS  5/17/2012            Family History   Problem Relation Age of Onset    High Cholesterol Father     Heart Failure Father     Osteoarthritis Mother     Thyroid Disease Mother        Social History     Social History    Marital status:      Spouse name: N/A    Number of children: N/A    Years of education: N/A     Occupational History    Not on file. Social History Main Topics    Smoking status: Never Smoker    Smokeless tobacco: Never Used    Alcohol use No    Drug use: No    Sexual activity: Not on file     Other Topics Concern    Not on file     Social History Narrative    No narrative on file       ROS: Negative or non-contributory other than as noted above. PHYSICAL EXAM:      Vitals:    11/11/18 1726   BP: (!) 104/46   Pulse: 63   Resp: 16   Temp:    SpO2:        General appearance: No acute distress. Open superficial wounds, swelling, and bruises on RUE and nose from today's fall. Sitting upright with HOB elevated, arms folded in front. Rheumatoid appearance of hands/digits. Neuro:    Mental status: Awake, alert. Speech: Not speaking. Nods head in response to questions; responses appear generally appropriate. When asked if she knows where she is, pt. nods \"yes\". When asked if she feels any better, pt. nods \"no\" or side-side (\"so-so\"). Cranial nerves: Pupils equal, round. EOM's - pt will not track with eyes. Right facial droop. Strength: Moving RUE and LUE purposefully. Holds RUE antigravity with some drift. Holds LUE antigravity without drift. Holds RLE antigravity with drift. Holds LLE antigravity without drift.      IMAGING:    CT HEAD WO CONTRAST  CT BRAIN PERFUSION  CTA HEAD W CONTRAST  CTA NECK W CONTRAST  CT FACIAL BONES WO CONTRAST  CT CERVICAL SPINE WO CONTRAST  EKG 12-LEAD    CT, CTA, CTP personally reviewed. See HPI and see my report in PACS. DATA:  Coags:   Lab Results   Component Value Date    INR 1.0 11/11/2018    PROTIME 11.7 11/11/2018    PROTIME 14.5 05/09/2012     CBC:   Lab Results   Component Value Date    WBC 16.6 11/11/2018    RBC 3.82 11/11/2018    HGB 10.9 11/11/2018    HCT 36.0 11/11/2018    MCV 94.2 11/11/2018    MCH 28.5 11/11/2018    MCHC 30.3 11/11/2018    RDW 15.0 11/11/2018     11/11/2018    MPV 9.7 11/11/2018     CBC with Differential:    Lab Results   Component Value Date    WBC 16.6 11/11/2018    RBC 3.82 11/11/2018    HGB 10.9 11/11/2018    HCT 36.0 11/11/2018     11/11/2018    MCV 94.2 11/11/2018    MCH 28.5 11/11/2018    MCHC 30.3 11/11/2018    RDW 15.0 11/11/2018    NRBC 3 05/28/2013    SEGSPCT 74 07/19/2013    METASPCT 1 06/26/2013    LYMPHOPCT 11.0 11/11/2018    MONOPCT 4.8 11/11/2018    MYELOPCT 2 06/13/2013    EOSPCT 0.1 11/06/2018    BASOPCT 0.3 11/11/2018    MONOSABS 0.80 11/11/2018    LYMPHSABS 1.82 11/11/2018    EOSABS 0.01 11/11/2018    BASOSABS 0.05 11/11/2018     Platelets:    Lab Results   Component Value Date     11/11/2018     BUN/Creatinine:    Lab Results   Component Value Date    BUN 31 11/11/2018    CREATININE 1.3 11/11/2018       ASSESSMENT AND PLAN:  Acute left MCA stroke, s/p IV alteplase with recovery of function in right arm and right leg. Speech has not recovered. Given the location of the core infarct, it is felt unlikely that speech will recover. Considering the patient's recovery post tPA, the risks of endovascular intervention, particularly the risks of intracranial hemorrhage and/or lose of recovered function, exceed the potential benefits of the procedure. The pt. is not felt to be a good candidate for endovascular intervention at this time. Discussed with Dr. Naty Negron.     Electronically signed by Bettina Patterson MD on 11/11/2018 at 5:46 PM

## 2018-11-11 NOTE — PLAN OF CARE
Problem: HEMODYNAMIC STATUS  Goal: Patient has stable vital signs and fluid balance  Outcome: Ongoing      Problem: ACTIVITY INTOLERANCE/IMPAIRED MOBILITY  Goal: Mobility/activity is maintained at optimum level for patient  Outcome: Not Met This Shift      Problem: COMMUNICATION IMPAIRMENT  Goal: Ability to express needs and understand communication  Outcome: Not Met This Shift

## 2018-11-11 NOTE — ED NOTES
seconds  - has a platelet count <118,427 uL  - serum blood glucose is <50 mg/dL or >400 mg/dL    Relative Contraindications:  - NIH Stroke score >22  - Patient's CT shows evidence of large MCA territory infarction (>1/3 the MCA territory)    *[facility] Policy Paper      Acute CVA Core Measures:     - t-PA Eligibility: IV t-PA was Administered-Total dose IV tPA is 0.9 mg/kg (maximum 90 mg). Given 10% over one minute, then remaining 90% given as infusion over 60 minutes. - The case has been discussed with Dr. Cesar Castañeda, they agree this patient is eligible for t-PA. Medical Decision Makin yo F presents with stroke like symptoms. Code stroke called upon patient arrival. NIHSS 22. Patient taken to CT, evidence of old stroke on CT head, perfusion scan shows large deficit. Decision is made to give TPA. On reassessment, patient having better ROM of the extremities, no change in speech. Per IR, no intervention will be performed at this time.  later arrives and tells at about 3:00 they were getting ready to leave their house and she went into a blank stare and wouldn't respond to him. He called 911. When EMS arrived he went to the door and when he came back to the room she had fallen and had scratches on her face. He did not witness the fall. Patient was admitted to neuro ICU. Results discussed with patient and .  voiced understanding with the plan. Re-Evaluations:             Re-evaluation. Improvement in ROM of extremities    This patient's ED course included: a personal history and physicial examination, re-evaluation prior to disposition, multiple bedside re-evaluations, IV medications, cardiac monitoring, continuous pulse oximetry, complex medical decision making and emergency management and a personal history and physicial eaxmination    This patient has remained hemodynamically stable during their ED course.     Consultations:             IR, IM,

## 2018-11-12 PROBLEM — M17.12 ARTHRITIS OF LEFT KNEE: Status: ACTIVE | Noted: 2018-01-01

## 2018-11-12 NOTE — PROGRESS NOTES
Dr. Gerson Barahona notified of consult.      Electronically signed by Corbin Patel RN MSN APRN-NP [facility] NP  [facility] 11/12/2018 4:19 PM

## 2018-11-12 NOTE — H&P
History and Physical      CHIEF COMPLAINT:  Facial droop      HISTORY OF PRESENT ILLNESS:      The patient is a 77 y.o. female patient of Dr Anthony Mejia who presents with slurred speech and facial droop. Patient currently nonverbal and no family at bedside. History obtained from extensive chart review. Apparently yesterday afternoon the patient and her  were getting rates of these home and she developed a blank stare and would not speak. She then fell to the right. She was noted to have right facial droop. He called 911. She exhibited significant right-sided weakness. In the emergency room her NIH stroke score was 22 and she was administered TPA. Interventional radiology was consulted and it was noted that CTA revealed a left MCA occlusion however it was noted that she had recovery of function in her right arm and leg although her speech had not recovered. It was determined that she would not be a good candidate for endovascular intervention at that time. She was also noted to have some left knee swelling apparently as a result of her fall with a contusion. She was seen by orthopedics and ice and elevation was recommended. She was admitted to the neuro intensive care. This morning she developed decreased level of consciousness and recurrent right-sided weakness. Due to the change in her condition repeat CT was obtained and revealed a large area of hypodensity is identified in the left posterior parietal and temporal region concerning for evolving acute infarct  without hemorrhagic conversion with some edema but no mass effect.      Past Medical History:    Past Medical History:   Diagnosis Date    Anemia     Arthritis     rheumatioid    Atrial fibrillation ([address]     Bursitis     CAD (coronary artery disease)     Chronic kidney disease     Hypothyroidism     Malaise and fatigue     Pure hypercholesterolemia 5/16/2018    RA (rheumatoid arthritis) ([address]     Septic shock(785.52)     UTI (urinary tract kg)   SpO2 100%   BMI 20.35 kg/m²     General appearance: Awake but nonverbal; blowing respirations  Head: Normocephalic, without obvious abnormality, facial contusion  Eyes: conjunctivae/corneas clear. PERRL, EOM's intact. Fundi benign. Ears: normal TM's and external ear canals both ears  Nose: Nares normal. Septum midline. Mucosa normal. No drainage or sinus tenderness.   Throat: lips, mucosa, and tongue normal; teeth and gums normal  Neck: no adenopathy, no carotid bruit, no JVD, supple, symmetrical, trachea midline and thyroid not enlarged, symmetric, no tenderness/mass/nodules  Lungs: clear to auscultation bilaterally  Heart: irregular, S1, S2 normal, no murmur, click, rub or gallop  Abdomen: soft, non-tender; bowel sounds normal; no masses,  no organomegaly  Extremities: extremities normal, contusion left knee, no cyanosis or edema  Pulses: 2+ and symmetric  Skin: Skin color, texture, turgor normal. No rashes or lesions  Neurologic: Flaccid right-sided paralysis with right facial droop; right Babinski noted    LABS:    CBC with Differential:    Lab Results   Component Value Date    WBC 32.7 11/12/2018    RBC 2.89 11/12/2018    HGB 8.4 11/12/2018    HCT 27.7 11/12/2018     11/12/2018    MCV 95.8 11/12/2018    MCH 29.1 11/12/2018    MCHC 30.3 11/12/2018    RDW 15.1 11/12/2018    NRBC 3 05/28/2013    SEGSPCT 74 07/19/2013    METASPCT 1.8 11/12/2018    LYMPHOPCT 2.6 11/12/2018    MONOPCT 7.9 11/12/2018    MYELOPCT 2 06/13/2013    EOSPCT 0.1 11/06/2018    BASOPCT 0.3 11/12/2018    MONOSABS 2.62 11/12/2018    LYMPHSABS 0.98 11/12/2018    EOSABS 0.00 11/12/2018    BASOSABS 0.00 11/12/2018     CMP:    Lab Results   Component Value Date     11/12/2018    K 5.3 11/12/2018     11/12/2018    CO2 14 11/12/2018    BUN 41 11/12/2018    CREATININE 2.1 11/12/2018    GFRAA 28 11/12/2018    LABGLOM 23 11/12/2018    GLUCOSE 66 11/12/2018    GLUCOSE 132 05/20/2012    PROT 7.1 11/11/2018    LABALBU 4.1 of the head were obtained  without contrast. Noncontrast images were reformatted in coronal and  sagittal planes.  Using a CT angiographic protocol, axial CT images of  the head and neck were obtained with bolus administration of  intravenous contrast.     Maximum intensity projection (MIP) images were generated in coronal  and sagittal planes. Rotating MIP images of the intracranial arterial  circulation were created. Rotating curved planar reformations were generated for the right and  left middle cerebral arteries. .     Three-dimensional (3-D) volume-rendered images of the intracranial and  cervical vessels were created. CT perfusion imaging of the brain was performed with injection of  intravenous contrast. Axial maps of cerebral blood flow (CBF),  cerebral blood volume (CBV), mean transit time (MTT), and time to  drain (TTD) were generated. Low-dose CT acquisition technique included one of following options:  (1) automated exposure control;  (2) adjustment of mA and/or kV  according to patient's size; or (3) use of iterative reconstruction. FINDINGS:  Noncontrast Head CT  Noncontrast CT of the head demonstrates no acute intracranial  hemorrhage or mass effect. The full report may be found in PACS. CTA of the Head  There is occlusion of the M1 segment of the left middle cerebral  artery (MCA). The M1 occlusion is distal to an inferior-division M2  branch that remains patent. There appears to be reasonable collateral  circulation to the left cerebral hemisphere. The left internal carotid  artery (ICA) and left anterior cerebral artery (LEI) are patent. The right ICA, MCA, and LEI are patent. The right posterior cerebral artery (PCA) is patent. The P1 and P2 segments of the left PCA are patent, but there is  stenosis at the origin of the P3 branches, and P3 branches appear  weakly enhanced and narrow. The basilar artery is patent. The left and right vertebral arteries are patent.  The of mandibular fracture. Severe  periodontal disease with multiple fractured teeth and dental caries. .  Severe degenerative changes of the TMJs bilaterally. .      MAXILLA: Makeda Lipschutz is no evidence of fracture. ZYGOMAS: Makeda Lipschutz is no evidence of fracture. ORBITS: Makeda Lipschutz is no evidence of fracture.  Orbital contents are  unremarkable.      SINUSES:  Small mucosal retention cyst right maxillary sinus,  otherwise the sinuses are clear. MASTOIDS:  The visualized mastoid air cells and temporal bones appear  normal.      NASAL BONES:  The nasal bones are intact. SKULL BASE:  The skull base and pterygoid plates are intact.  There is  no evidence of a mid-face fracture.  Visualized calvarium appears  normal.      CERVICAL SPINE:  The visualized cervical spine is normal and without  fracture.      SOFT TISSUES:  The soft tissues appear normal.  There is no focal  swelling, gas, or foreign body. Facet calcifications in both carotid  siphons are noted. Impression:       NO ACUTE FACIAL BONE FRACTURE. SEVERE PERIODONTAL DISEASE. DEGENERATIVE CHANGES OF THE BILATERAL TEMPOROMANDIBULAR JOINTS. CT Head WO Contrast [[id number]] Resulted: 18 1623   Updated: 18 1640    Narrative:     Patient MRN:  [id number]  : 1950  Age: 77 years  Gender: Female  Order Date:  2018 3:45 PM  Exam: CT HEAD WO CONTRAST  Number of images:  364  Indication: Global aphasia and right-sided weakness. Evaluation for  acute stroke. Comparison: No prior study is available for comparison. Technique:  Computed tomography of the head was performed without  intravenous contrast. Images were constructed in the axial plane. Multiplanar reformation of the axial images was performed in coronal  and sagittal planes. Low-dose CT acquisition technique included one of following options:  (1) automated exposure control;  (2) adjustment of mA and/or kV  according to patient's size; or (3) use of iterative reconstruction. Non-African American 40 mL/min/1.73    Comment: Chronic Kidney Disease: less than 60 ml/min/1.73 sq. m.         Kidney Failure: less than 15 ml/min/1.73 sq.m. Results valid for patients 18 years and older.         GFR African American 49    Calcium 9.5 mg/dL    Total Protein 7.1 g/dL    Alb 4.1 g/dL    Total Bilirubin 0.4 mg/dL    Alkaline Phosphatase 80 U/L    ALT 10 U/L    AST 14 U/L   APTT [[id number]] (Abnormal) Collected: 11/11/18 1536   Updated: 11/11/18 1553    Specimen Type: Blood    Specimen Source: Blood     aPTT 24.2 (L) sec   Protime-INR [[id number]] Collected: 11/11/18 1536   Updated: 11/11/18 1550    Specimen Type: Blood    Specimen Source: Blood     Protime 11.7 sec    INR 1.0   CBC Auto Differential [[id number]] (Abnormal) Collected: 11/11/18 1541   Updated: 11/11/18 1550    Specimen Source: Blood     WBC 16.6 (H) E9/L    RBC 3.82 E12/L    Hemoglobin 10.9 (L) g/dL    Hematocrit 36.0 %    MCV 94.2 fL    MCH 28.5 pg    MCHC 30.3 (L) %    RDW 15.0 fL    Platelets 797 U0/U    MPV 9.7 fL    Neutrophils % 81.6 (H) %    Immature Granulocytes % 2.2 %    Lymphocytes % 11.0 (L) %    Monocytes % 4.8 %    Eosinophils % 0.1 %    Basophils % 0.3 %    Neutrophils # 13.54 (H) E9/L    Immature Granulocytes # 0.37 E9/L    Lymphocytes # 1.82 E9/L    Monocytes # 0.80 E9/L    Eosinophils # 0.01 (L) E9/L    Basophils # 0.05 E9/L   POCT Glucose [[id number]] (Abnormal) Collected: 11/11/18 1534   Updated: 11/11/18 1543     Meter Glucose 123 (H) mg/dL         Problem list:    Patient Active Problem List   Diagnosis    Paroxysmal atrial fibrillation (HCC)    Anemia    Hematuria    A-fib (HCC)    Septic shock (HCC)    Rheumatoid aortitis    Pneumonia    Bacteremia    Rheumatoid arthritis (HCC)    Weakness    Pulmonary nodules    Hypokalemia    Decubitus skin ulcer    GI bleed    Hypoglycemia    Nausea    CKD (chronic kidney disease) stage 3, GFR 30-59 ml/min (HCC)    Pure hypercholesterolemia    Reflux

## 2018-11-12 NOTE — PROGRESS NOTES
[facility] Intensive Care Unit  Critical Care Consult 11/12/2018    Date of Admission: 11/11/2018    CC: Follow-up for left MCA stroke    HOSPITAL COURSE/OVERNIGHT EVENTS:  11/11 acute onset aphasia and right-sided weakness. LKW 1500. And I HSS 22. TPA given. Not a candidate for mechanical thrombectomy embolectomy. Admitted to NSICU.    11/12  Afebrile. Vital signs stable. He did not require any PRN  Antihypertensive agents. PMH:   Past Medical History:   Diagnosis Date    Anemia     Arthritis     rheumatioid    Atrial fibrillation ([address]     Bursitis     CAD (coronary artery disease)     Chronic kidney disease     Hypothyroidism     Malaise and fatigue     Pure hypercholesterolemia 5/16/2018    RA (rheumatoid arthritis) ([address]     Septic shock(785.52)     UTI (urinary tract infection)      PSH:   Past Surgical History:   Procedure Laterality Date    CATARACT REMOVAL  12/2017    ECHO COMPL W DOP COLOR FLOW  5/22/2013         THORACENTESIS  5/17/2012          Home Medications:   Prior to Admission medications    Medication Sig Start Date End Date Taking?  Authorizing Provider   diltiazem (CARTIA XT) 300 MG extended release capsule Take 1 capsule by mouth daily 10/24/18  Yes Bing Woodson MD   metoprolol succinate (TOPROL XL) 100 MG extended release tablet take 1 tablet by mouth twice a day 9/11/18  Yes Bing Woodson MD   levothyroxine (SYNTHROID) 50 MCG tablet Take 1 tablet by mouth Daily 5/16/18  Yes Marcial Alamo MD   pantoprazole (PROTONIX) 40 MG tablet Take 1 tablet by mouth every morning (before breakfast) 5/16/18  Yes Marcial Alamo MD   pravastatin (PRAVACHOL) 40 MG tablet Take 1 tablet by mouth daily 5/16/18  Yes Marcial Alamo MD   DIGITEK 125 MCG tablet take 1 tablet by mouth once daily 3/13/18  Yes Bing Woodson MD   predniSONE (DELTASONE) 5 MG tablet Take 5 mg by mouth daily    Yes Historical Provider, MD   Cholecalciferol (VITAMIN D-3) 1000 UNITS CAPS Take 1,000

## 2018-11-12 NOTE — PROGRESS NOTES
Physical Therapy    Facility/Department: [facility], [place] [facility]  Initial Assessment    NAME: Sarah Mac  : 1950  MRN: [id number]    Date of Service: 2018  Evaluating Therapist: Allyson Ni, PT, DPT    ROOM #: [id number]  DIAGNOSIS: Acute CVA  PRECAUTIONS: Falls, L MCA, Aphasia, R hemiparesis, WBAT LLE s/p L knee contusion  PMHx: Arthritis, Afib, CAD, CKD, RA  PROCEDURES: s/p tPA    Social:  Pt lives with , per chart. Prior to admission pt walked with [facility] for short distances and  for longer distances. Initial Evaluation  Date: 18 Treatment  Date:     Short Term/ Long Term   Goals   AM-PAC 6 Clicks 3/74     Does pt have pain? No signs of pain     Bed Mobility  Rolling: Dep to L, Max to R  Supine to sit: NT  Sit to supine: NT  Scooting: Dep x 2 to [facility]  MaxA   Transfers Sit to stand: NT  Stand to sit: NT  Stand pivot: NT  MaxA with AAD   Ambulation   NT  >10 feet with MaxA with AAD   Stair negotiation: ascended and descended NA     BLE ROM PROM WNL     BLE strength LLE Grossly 3/5  RLE 0-1/5  Increase by 1/3 MMT grade   Balance Sitting: NT  Standing: NT  Sitting: Memo  Standing: MaxA with AAD     Vitals:  Heart Rate at rest 98 bpm Heart Rate post session 112 bpm   SpO2 at rest 100% SpO2 post session 95%   Blood Pressure at rest 103/29 mmHg Blood Pressure post session 117/46 mmHg     Functional Status Score-Intensive Care Unit (FSS-ICU)   Rolling NA/7   Supine to sit transfer NA/7   Unsupported sitting  NA/7   Sit to stand transfers NA/7   Ambulation NA/7   Total  NA/35     Pt is alert and oriented x 0-1 - opens eyes to name  CAM-ICU: unable to assess due to lethargy  RASS: -2  Sensation: unable to assess  Edema: L knee edema    ASSESSMENT  Pt displays functional ability as noted in the objective portion of this evaluation. Comments/Treatment:  RN reported pt was stable for session. Pt supine in bed upon arrival.  OT collaborated for session. Pt opened eyes briefly to voice/name.   Pt

## 2018-11-12 NOTE — CONSULTS
Consult to Neurology  Consult performed by: Lizzie Baca ordered by: Warren Hernandez is a 77 y.o. female    Neurology consulted for acute ischemic stroke    No family at bedside    Past Medical History:     Past Medical History:   Diagnosis Date    Anemia     Arthritis     rheumatioid    Atrial fibrillation ([facility] [address]     Bursitis     CAD (coronary artery disease)     Chronic kidney disease     Hypothyroidism     Malaise and fatigue     Pure hypercholesterolemia 5/16/2018    RA (rheumatoid arthritis) ([facility] [address]     Septic shock(785.52)     UTI (urinary tract infection)      Past Surgical History:     Past Surgical History:   Procedure Laterality Date    CATARACT REMOVAL  12/2017    ECHO COMPL W DOP COLOR FLOW  5/22/2013         THORACENTESIS  5/17/2012          Allergies:     Warfarin; Biaxin [clarithromycin]; Ciprofloxacin; Warfarin and related [coumarin]; Zithromax [azithromycin]; Sulfamethoxazole-trimethoprim; and Ultram [tramadol hcl]    Medications:     Prior to Admission medications    Medication Sig Start Date End Date Taking?  Authorizing Provider   diltiazem (CARTIA XT) 300 MG extended release capsule Take 1 capsule by mouth daily 10/24/18  Yes Franky Chen MD   metoprolol succinate (TOPROL XL) 100 MG extended release tablet take 1 tablet by mouth twice a day 9/11/18  Yes Franky Chen MD   levothyroxine (SYNTHROID) 50 MCG tablet Take 1 tablet by mouth Daily 5/16/18  Yes Fabi Bowden MD   pantoprazole (PROTONIX) 40 MG tablet Take 1 tablet by mouth every morning (before breakfast) 5/16/18  Yes Fabi Bowden MD   pravastatin (PRAVACHOL) 40 MG tablet Take 1 tablet by mouth daily 5/16/18  Yes Fabi Bowden MD   DIGITEK 125 MCG tablet take 1 tablet by mouth once daily 3/13/18  Yes Franky Chen MD   predniSONE (DELTASONE) 5 MG tablet Take 5 mg by mouth daily    Yes Historical Provider, MD   Cholecalciferol (VITAMIN D-3) 1000 UNITS CAPS Take 1,000 team to start Mannitol, potential intubation for inducing hypocarbia and consult NSGY for hemicraniectomy. We will follow up.     -------------------------------------    All questions were answered to the patient's/family's satisfaction when they are available. I personally reviewed the history, exam findings, labs, imagings and other diagnostic tests/conveyed these findings and my assessment of these aforementioned items and also treatment plan, risks/benefits of treatment recommended and prognosis/goals of treatment to patient/family/staff. I spent 70 minutes of critical time personally with the patient/family/staff/resident(s) in examination, chart and imaging review, discussing natural history and prognosis, differential diagnosis, risks and benefits of treatment and instructions of which more than 50% of the time was spent for counseling and coordinating care.

## 2018-11-12 NOTE — PROGRESS NOTES
and patient's mother at bedside. Updated on condition. Spoke with  out of the room. Discussed her stroke and changes in neurological condition. They have not had any conversations regarding intubation or code status. He is requesting a full code status. \We discussed palliative care consult. He is agreeable.       Electronically signed by Santiago Carrasco RN MSN APRN-NP [facility] NP  CCNS CCRN 11/12/2018 10:35 AM

## 2018-11-12 NOTE — PROCEDURES
Allison Dias is a 77 y.o. female patient. 1. Cerebrovascular accident (CVA), unspecified mechanism ([facility] [address]      Past Medical History:   Diagnosis Date    Anemia     Arthritis     rheumatioid    Atrial fibrillation ([facility] [address]     Bursitis     CAD (coronary artery disease)     Chronic kidney disease     Hypothyroidism     Malaise and fatigue     Pure hypercholesterolemia 5/16/2018    RA (rheumatoid arthritis) ([facility] [address]     Septic shock(785.52)     UTI (urinary tract infection)      Blood pressure (!) 98/40, pulse 85, temperature 98.5 °F (36.9 °C), temperature source Axillary, resp. rate 29, height 5' 6\" (1.676 m), weight 126 lb 1.6 oz (57.2 kg), SpO2 100 %. Central Line  Date/Time: 11/12/2018 4:15 PM  Performed by: Ariadne Carney  Authorized by: Hailey Sosa   Consent: Written consent obtained. Risks and benefits: risks, benefits and alternatives were discussed  Consent given by: spouse  Patient identity confirmed: arm band  Time out: Immediately prior to procedure a \"time out\" was called to verify the correct patient, procedure, equipment, support staff and site/side marked as required.   Indications: vascular access  Anesthesia: local infiltration    Anesthesia:  Local Anesthetic: lidocaine 1% with epinephrine  Preparation: skin prepped with 2% chlorhexidine  Skin prep agent dried: skin prep agent completely dried prior to procedure  Sterile barriers: all five maximum sterile barriers used - cap, mask, sterile gown, sterile gloves, and large sterile sheet  Hand hygiene: hand hygiene performed prior to central venous catheter insertion  Location details: right internal jugular  Patient position: reverse Trendelenburg  Catheter type: triple lumen  Catheter size: 7 Fr  Ultrasound guidance: yes  Sterile ultrasound techniques: sterile gel and sterile probe covers were used  Number of attempts: 1  Successful placement: yes  Post-procedure: line sutured and dressing applied  Assessment: blood return through all

## 2018-11-12 NOTE — PROGRESS NOTES
Called by nurse Segun Augustin that pt was in cardiac arrest.  When I arrived to bedside, CPR was being underaken  It had started at 506. After mutliple rounds of CPR (refer to nursing [facility] for meds), ROSC at 521 pm.   Discussion with son. Explained the poor prognosis with 15 minutes of low oxygenation and poor perfusion from the cardiac arrest.  He explained that she has been sick for the past 5 years  Code status changed to DNR CC  Breathing tube removed.     Electronically signed by Josh Zazueta MD on 11/12/2018 at 6:05 PM

## 2018-11-13 ENCOUNTER — TELEPHONE (OUTPATIENT)
Dept: PRIMARY CARE CLINIC | Age: 68
End: 2018-11-13

## 2018-11-13 LAB — MRSA CULTURE ONLY: NORMAL

## 2018-11-13 NOTE — PROGRESS NOTES
Upon walking into pts room I observed that she had developed agonal breathing, her oxygen saturation was beginning to drop into 80's, and her face was extremely pale. I asked another nurse to notify physician to come intubate pt and grabbed ambu-bag to start rescue breaths. Despite this her oxygen saturation continued to be in 60's-70's and eventually became very bradycardic until we lost a palpable pulse and begun compressions. Code called and team arrived.

## 2018-11-13 NOTE — CONSULTS
respiratory distress and respiratory failure. She still has corneals and weak gag. REVIEW OF IMAGING:  She had CT scan of her head that shows that she has  a large left middle cerebral artery stroke. ASSESSMENT AND PLAN:  The patient is a 38-year-old lady with a large  left MCA stroke. Her neurologic exam is poor. She is not a candidate  for any surgical intervention. She did go 15 minutes without pulse and  I have discussed the overall poor prognosis with her  and he is  in agreement that we should discontinue supportive therapy at this time.         Olivia Sterling MD    D: 11/12/2018 17:48:39       T: 11/12/2018 21:47:37     [email or web address]  Job#: [id number]     Doc#: [id number]    CC:

## 2018-11-18 NOTE — ED PROVIDER NOTES
Cosign Needed   ED Notes Date of Service: 11/11/2018  3:30 PM         []Manual[]Template  []Copied     Department of Emergency Medicine   ED  Provider Note  Admit Date/RoomTime: 11/11/2018  3:31 PM  ED Room: 16/16 11/11/18  4:05 PM     Stroke Alert called: 7564     HISTORY OF PRESENT ILLNESS:  (Nurses Notes Reviewed)     Chief Complaint:   Cerebrovascular Accident (witnessed by spouse lkw at 1500 when pt fell to right and  noticed right sided facial droop)        Source of history provided by:  EMS personnel. History/Exam Limitations: mental status.                 Craig Dixon is a 77 y.o. old female presenting to the emergency department by EMS with a PMH of PAF, RA, CKD, hypothyroidism presents with stroke like symptoms. Last known well 1500. Per EMS,  noticed R sided facial droop and patient had a fall.     Code Status on file: Prior.        NIH Stroke Scale at time of initial evaluation:   NIH/MNHISS Stroke Scale  Interval: Baseline  Level of Consciousness (1a. ): Alert  LOC Questions (1b. ): Incorrect  LOC Commands (1c. ): Incorrect  Best Gaze (2. ): (!) Partial gaze palsy  Visual (3. ): No visual loss  Facial Palsy (4. ): (!) Complete  Motor Arm, Left (5a. ): Drift, but does not hit bed  Motor Arm, Right (5b. ): No effort against gravity  Motor Leg, Left (6a. ): Some effort against gravity  Motor Leg, Right (6b. ): No effort against gravity  Limb Ataxia (7. ): Absent  Sensory (8. ): Normal  Best Language (9. ): Mute  Dysarthria (10. ): Near to unintelligible  Extinction and Inattention (11): No neglect  Total: 22        Past Medical History:  has a past medical history of Anemia; Arthritis; Atrial fibrillation ([address] Bursitis; CAD (coronary artery disease); Chronic kidney disease; Hypothyroidism; Malaise and fatigue; Pure hypercholesterolemia; RA (rheumatoid arthritis) ([address]  Septic shock(785.52); and UTI (urinary tract infection).     Past Surgical History:  has a past surgical Capillary refill <3 seconds  Skin: warm and dry. No rashes. Neurologic: Please also see NIH stroke scale.   Psych: Patient makes eye contact, will not speak        -------------------------------------------------- RESULTS -------------------------------------------------  All laboratory and imaging studies have been reviewed by myself     LABS:        Results for orders placed or performed during the hospital encounter of 11/11/18   CBC Auto Differential   Result Value Ref Range     WBC 16.6 (H) 4.5 - 11.5 E9/L     RBC 3.82 3.50 - 5.50 E12/L     Hemoglobin 10.9 (L) 11.5 - 15.5 g/dL     Hematocrit 36.0 34.0 - 48.0 %     MCV 94.2 80.0 - 99.9 fL     MCH 28.5 26.0 - 35.0 pg     MCHC 30.3 (L) 32.0 - 34.5 %     RDW 15.0 11.5 - 15.0 fL     Platelets 308 228 - 308 E9/L     MPV 9.7 7.0 - 12.0 fL     Neutrophils % 81.6 (H) 43.0 - 80.0 %     Immature Granulocytes % 2.2 0.0 - 5.0 %     Lymphocytes % 11.0 (L) 20.0 - 42.0 %     Monocytes % 4.8 2.0 - 12.0 %     Eosinophils % 0.1 0.0 - 6.0 %     Basophils % 0.3 0.0 - 2.0 %     Neutrophils # 13.54 (H) 1.80 - 7.30 E9/L     Immature Granulocytes # 0.37 E9/L     Lymphocytes # 1.82 1.50 - 4.00 E9/L     Monocytes # 0.80 0.10 - 0.95 E9/L     Eosinophils # 0.01 (L) 0.05 - 0.50 E9/L     Basophils # 0.05 0.00 - 0.20 E9/L   Protime-INR   Result Value Ref Range     Protime 11.7 9.3 - 12.4 sec     INR 1.0     APTT   Result Value Ref Range     aPTT 24.2 (L) 24.5 - 35.1 sec   POCT Glucose   Result Value Ref Range     Meter Glucose 123 (H) 74 - 99 mg/dL         RADIOLOGY:  Interpreted by Radiologist.  CT Head WO Contrast    (Results Pending)   CT Brain Perfusion    (Results Pending)   CTA HEAD W CONTRAST    (Results Pending)   CTA NECK W CONTRAST    (Results Pending)   CT Facial Bones WO Contrast    (Results Pending)         EKG Interpretation  Interpreted by emergency department physician.      Rhythm: sinus bradycardia  Rate: 58  Axis: normal  Conduction: normal  ST Segments: no acute change  T Waves: no acute change     Clinical Impression: sinus bradycardia, criteria for LVH  Comparison to Old EKG  Previously afib with LVH              ------------------------- NURSING NOTES AND VITALS REVIEWED ---------------------------              The nursing notes within the ED encounter and vital signs as below have been reviewed. BP (!) 142/72   Resp 18   Ht 5' 6\" (1.676 m)   Wt 120 lb (54.4 kg)   SpO2 100%   BMI 19.37 kg/m²   Oxygen Saturation Interpretation: Normal     The patients available past medical records and past encounters were reviewed.          ------------------------------ ED COURSE/MEDICAL DECISION MAKING----------------------  Medications   sodium chloride flush 0.9 % injection 10 mL (not administered)   iopamidol (ISOVUE-370) 76 % injection 100 mL (not administered)   sodium chloride flush 0.9 % injection 10 mL (not administered)   sodium chloride flush 0.9 % injection 10 mL (not administered)   dextrose 50 % solution 12.5 g (not administered)   alteplase (ACTIVASE) injection 4.9 mg (not administered)     Followed by   alteplase (ACTIVASE) injection 44.1 mg (not administered)         Based upon patient's stroke like symptoms a stroke neurology consult is indicated. Consult to Neurology completed. [facility] by telephone        Acute CVA Core Measures:      NIH Stroke Scale Total: Total: 22  t-PA Eligibility: was recommeded by [employer] and under the order of the ED physician  IV t-PA was Administered-Total dose IV tPA is 0.9 mg/kg (maximum 90 mg). Given 10% over one minute, then remaining 90% given as infusion over 60 minutes.         Last known well time: 1500  NIH Stroke Scale at time of initial evaluation: 22  1A: Level of Consciousness 0 - alert; keenly responsive   1B: Ask Month and Age 2 - answers neither question correctly   1C:  Tell Patient To Open and Close Eyes, then Hand  Squeeze 2 - performs neither task correctly   2: Test Horizontal pulse oximetry, complex medical decision making and emergency management and a personal history and physicial eaxmination     This patient has remained hemodynamically stable during their ED course.     Consultations:             IR, IM, Telestroke           Dr. Felisha Stevens will admit the patient     Critical Care: 35 minutes           Counseling: The emergency provider has spoken with the patient and spouse/SO and discussed todays presentation, condition, results, treatment options and risk/benefits of thrombolytic therapy, in addition to providing specific details for the plan of care and counseling regarding the diagnosis and prognosis. Questions are answered at this time and they are agreeable with the plan.            --------------------------------- IMPRESSION AND DISPOSITION ---------------------------------     IMPRESSION  CVA     DISPOSITION  Disposition: Neuro ICU  Patient condition is serious                             Emy Figueroa DO  Resident  11/11/18 2525        Emy Figueroa DO  Resident  11/17/18 [address], [place]  11/17/18 7349    ATTENDING PROVIDER ATTESTATION:     Cece Gomez presented to the emergency department for evaluation of Cerebrovascular Accident (witnessed by spouse lkw at 1500 when pt fell to right and  noticed right sided facial dropps)   and was initially evaluated by the Medical Resident. See Original ED Note for H&P and ED course above. I have reviewed and discussed the case, including pertinent history (medical, surgical, family and social) and exam findings with the Medical Resident assigned to Cece Gomez.  I have personally performed and/or participated in the history, exam, medical decision making, and procedures and agree with all pertinent clinical information. I have reviewed my findings and recommendations with the assigned Medical Resident, Cece Gomez and members of family present at the time of disposition.     My findings/plan: The primary encounter diagnosis was Cerebrovascular accident (CVA), unspecified mechanism ([facility] [address] A diagnosis of Septic shock ([facility] [address] was also pertinent to this visit.   Discharge Medication List as of 11/12/2018 10:43 PM          Critical Care Time: 30 minutes   MD Herrera Garcia MD  11/17/18 3423

## 2018-12-09 NOTE — DISCHARGE SUMMARY
Lab Results   Component Value Date     CALCIUM 9.1 2018      PT/INR:          Lab Results   Component Value Date     PROTIME 11.7 2018     PROTIME 14.5 2012     INR 1.0 2018      PTT:          Lab Results   Component Value Date     APTT 24.2 2018   [APTT}  Last 3 Troponin:          Lab Results   Component Value Date     TROPONINI 0.02 2018     TROPONINI 0.03 2013     TROPONINI 0.03 2013     TROPONINI 0.05 2013     TROPONINI 0.04 05/10/2012     TROPONINI 0.06 2012      U/A:          Lab Results   Component Value Date     COLORU YELLOW 07/15/2013     PROTEINU TRACE 07/15/2013     PHUR 6.0 07/15/2013     LABCAST MODERATE 2013     WBCUA >20 07/15/2013     WBCUA PACKED 2012     RBCUA >20 07/15/2013     BACTERIA MANY 07/15/2013     CLARITYU CLOUDY 07/15/2013     SPECGRAV >=1.030 07/15/2013     LEUKOCYTESUR MODERATE 07/15/2013     UROBILINOGEN 1.0 07/15/2013     BILIRUBINUR NEGATIVE 07/15/2013     BILIRUBINUR MODERATE 2012     BLOODU LARGE 07/15/2013     GLUCOSEU 100 07/15/2013     GLUCOSEU NEGATIVE 2012     AMORPHOUS FEW 2013            CT HEAD WO CONTRAST [[id number]] Resulted: 18 1121   Updated: 18 1123     Narrative:     Patient MRN:  [id number]  : 1950  Age: 77 years  Gender: Female    Order Date:  2018 10:45 AM    EXAM: CT HEAD WO CONTRAST number of images 106    Technique: Low-dose CT  acquisition technique included one of  following options; 1 . Automated exposure control, 2. Adjustment of MA  and or KV according to patient's size or 3. Use of iterative  reconstruction. INDICATION:  stroke with neruo changes      COMPARISON: 2018    FINDINGS:  A large area of hypodensity is identified in the left posterior  parietal and temporal region concerning for evolving acute infarct  without hemorrhagic conversion. There is some edema without mass  effect.  Old infarct with encephalomalacia is cerebral arteries. .     Three-dimensional (3-D) volume-rendered images of the intracranial and  cervical vessels were created. CT perfusion imaging of the brain was performed with injection of  intravenous contrast. Axial maps of cerebral blood flow (CBF),  cerebral blood volume (CBV), mean transit time (MTT), and time to  drain (TTD) were generated. Low-dose CT acquisition technique included one of following options:  (1) automated exposure control;  (2) adjustment of mA and/or kV  according to patient's size; or (3) use of iterative reconstruction. FINDINGS:  Noncontrast Head CT  Noncontrast CT of the head demonstrates no acute intracranial  hemorrhage or mass effect. The full report may be found in PACS. CTA of the Head  There is occlusion of the M1 segment of the left middle cerebral  artery (MCA). The M1 occlusion is distal to an inferior-division M2  branch that remains patent. There appears to be reasonable collateral  circulation to the left cerebral hemisphere. The left internal carotid  artery (ICA) and left anterior cerebral artery (LEI) are patent. The right ICA, MCA, and LEI are patent. The right posterior cerebral artery (PCA) is patent. The P1 and P2 segments of the left PCA are patent, but there is  stenosis at the origin of the P3 branches, and P3 branches appear  weakly enhanced and narrow. The basilar artery is patent. The left and right vertebral arteries are patent. The left vertebral  artery is dominant. The right and left posterior inferior cerebellar  arteries originate from the left vertebral artery. No intracranial aneurysm is seen. CTA of the Neck  The bilateral common, internal, and external carotid arteries are  patent, without evidence of hemodynamically significant stenosis. The preforaminal (V1) and foraminal (V2) segments of the vertebral  arteries are patent, without evidence of hemodynamically significant  stenosis.     CT Perfusion of the

## 2019-02-18 ENCOUNTER — TELEPHONE (OUTPATIENT)
Dept: PRIMARY CARE CLINIC | Age: 69
End: 2019-02-18